# Patient Record
Sex: MALE | Race: BLACK OR AFRICAN AMERICAN | NOT HISPANIC OR LATINO | Employment: STUDENT | URBAN - METROPOLITAN AREA
[De-identification: names, ages, dates, MRNs, and addresses within clinical notes are randomized per-mention and may not be internally consistent; named-entity substitution may affect disease eponyms.]

---
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
## 2024-04-25 ENCOUNTER — OFFICE VISIT (OUTPATIENT)
Dept: URGENT CARE | Facility: CLINIC | Age: 19
End: 2024-04-25
Payer: MEDICAID

## 2024-04-25 VITALS
TEMPERATURE: 98 F | DIASTOLIC BLOOD PRESSURE: 68 MMHG | RESPIRATION RATE: 16 BRPM | HEIGHT: 66 IN | OXYGEN SATURATION: 98 % | BODY MASS INDEX: 21.44 KG/M2 | HEART RATE: 75 BPM | WEIGHT: 133.38 LBS | SYSTOLIC BLOOD PRESSURE: 110 MMHG

## 2024-04-25 DIAGNOSIS — Z20.2 EXPOSURE TO STD: ICD-10-CM

## 2024-04-25 DIAGNOSIS — N34.2 URETHRITIS: Primary | ICD-10-CM

## 2024-04-25 LAB
BILIRUB UR QL STRIP: NEGATIVE
GLUCOSE UR QL STRIP: NEGATIVE
KETONES UR QL STRIP: NEGATIVE
LEUKOCYTE ESTERASE UR QL STRIP: NEGATIVE
PH, POC UA: 5.5 (ref 5–8)
POC BLOOD, URINE: NEGATIVE
POC NITRATES, URINE: NEGATIVE
PROT UR QL STRIP: NEGATIVE
SP GR UR STRIP: 1.02 (ref 1–1.03)
UROBILINOGEN UR STRIP-ACNC: 1 (ref 0.3–2.2)

## 2024-04-25 PROCEDURE — 86593 SYPHILIS TEST NON-TREP QUANT: CPT | Performed by: FAMILY MEDICINE

## 2024-04-25 PROCEDURE — 99214 OFFICE O/P EST MOD 30 MIN: CPT | Mod: S$GLB,,, | Performed by: FAMILY MEDICINE

## 2024-04-25 PROCEDURE — 87389 HIV-1 AG W/HIV-1&-2 AB AG IA: CPT | Performed by: FAMILY MEDICINE

## 2024-04-25 PROCEDURE — 81003 URINALYSIS AUTO W/O SCOPE: CPT | Mod: QW,S$GLB,, | Performed by: FAMILY MEDICINE

## 2024-04-25 PROCEDURE — 87591 N.GONORRHOEAE DNA AMP PROB: CPT | Performed by: FAMILY MEDICINE

## 2024-04-25 PROCEDURE — 87661 TRICHOMONAS VAGINALIS AMPLIF: CPT | Performed by: FAMILY MEDICINE

## 2024-04-25 PROCEDURE — 87491 CHLMYD TRACH DNA AMP PROBE: CPT | Performed by: FAMILY MEDICINE

## 2024-04-25 PROCEDURE — 36415 COLL VENOUS BLD VENIPUNCTURE: CPT | Performed by: FAMILY MEDICINE

## 2024-04-25 RX ORDER — CEFTRIAXONE 500 MG/1
500 INJECTION, POWDER, FOR SOLUTION INTRAMUSCULAR; INTRAVENOUS
Status: COMPLETED | OUTPATIENT
Start: 2024-04-25 | End: 2024-04-25

## 2024-04-25 RX ORDER — DOXYCYCLINE HYCLATE 100 MG
100 TABLET ORAL 2 TIMES DAILY
Qty: 14 TABLET | Refills: 0 | Status: SHIPPED | OUTPATIENT
Start: 2024-04-25 | End: 2024-05-02

## 2024-04-25 RX ADMIN — CEFTRIAXONE 500 MG: 500 INJECTION, POWDER, FOR SOLUTION INTRAMUSCULAR; INTRAVENOUS at 08:04

## 2024-04-26 LAB — HIV 1+2 AB+HIV1 P24 AG SERPL QL IA: NORMAL

## 2024-04-26 NOTE — PATIENT INSTRUCTIONS
Since you are having discharge from the penis we do recommend treating with an antibiotic now for possible gonorrhea or chlamydia.  This would be a shot of Rocephin here in clinic and then starting doxycycline antibiotic today as well this is a 7 day course of an oral antibiotic.    We are going to test for gonorrhea and chlamydia and Trichomonas and syphilis and HIV.      We will call you if there is any abnormal labs.

## 2024-04-26 NOTE — PROGRESS NOTES
"Subjective:      Patient ID: Dwaine Zimmer is a 19 y.o. male.    Vitals:  height is 5' 6.2" (1.681 m) and weight is 60.5 kg (133 lb 6.1 oz). His oral temperature is 98.4 °F (36.9 °C). His blood pressure is 110/68 and his pulse is 75. His respiration is 16 and oxygen saturation is 98%.     Chief Complaint: Exposure to STD    Pt presents today w/ bump on tip of penis ; onset approx 5x days ago. Pt c/o dysuria, white discharge and penile pain. Pt denies fever, swelling and trauma. Pt reports he is sexually active and inconsistent with condom use. Pt didn't try anything for sx.      Exposure to STD  The patient's primary symptoms include genital lesions (had some bump son tip of penis now resolved), penile discharge and penile pain. The patient's pertinent negatives include no genital injury, genital itching, pelvic pain, priapism, scrotal swelling or testicular pain. This is a new problem. The current episode started in the past 7 days. The problem occurs constantly. The problem has been unchanged. Associated symptoms include dysuria and painful intercourse. Pertinent negatives include no abdominal pain, anorexia, chest pain, chills, constipation, coughing, diarrhea, discolored urine, fever, flank pain, frequency, headaches, hematuria, hesitancy, joint pain, joint swelling, nausea, rash, shortness of breath, sore throat, urgency, urinary retention or vomiting. The penile discharge was White. The genital lesion's characteristics include Vesicle and rash. The symptoms are aggravated by urination. He has tried nothing for the symptoms. The treatment provided no relief. He is sexually active. He inconsistently uses condoms. It is unknown whether or not his partner has an STD. There is no history of BPH, chlamydia, cryptorchidism, erectile aid use, erectile dysfunction, a femoral hernia, gonorrhea, herpes simplex, HIV, an inguinal hernia, kidney stones, prostatitis, sickle cell disease, syphilis or varicocele. "       Constitution: Negative for chills and fever.   HENT:  Negative for sore throat.    Cardiovascular:  Negative for chest pain.   Respiratory:  Negative for cough and shortness of breath.    Gastrointestinal:  Negative for abdominal pain, nausea, vomiting, constipation and diarrhea.   Genitourinary:  Positive for dysuria, penile discharge and penile pain. Negative for frequency, urgency, flank pain, scrotal swelling, testicular pain and pelvic pain.   Skin:  Negative for rash.   Neurological:  Negative for headaches.      Objective:     Physical Exam  Constitutional: Pt oriented to person, place, and time.  Non-toxic appearance.   Patient does not appear ill. No distress. normal  HENT: No icterus or facial swelling appreciated  Head: Normocephalic and atraumatic.   Nose: No congestion.   Pulmonary/Chest: Effort normal. No stridor. No respiratory distress.   Abdominal: Normal appearance. Abdomen exhibits no distension.   GYN:  No penile rashes appreciated.  Scrotum without swelling or erythema were appreciable rashes.  No active drainage from meatus  Musculoskeletal:         General: No swelling.   Neurological: no focal deficit. Patient is alert and oriented to person, place, and time.   Skin: Skin is not diaphoretic and not pale. no jaundice  Psychiatric: Patients behavior is normal. Mood, judgment and thought content normal.     Assessment:     1. Urethritis    2. Exposure to STD        Plan:       Urethritis  -     C. trachomatis/N. gonorrhoeae by AMP DNA [facility]; Urine  -     Misc Sendout Test, Non-Blood trichomonas urine test  -     cefTRIAXone injection 500 mg  -     doxycycline (VIBRA-TABS) 100 MG tablet; Take 1 tablet (100 mg total) by mouth 2 (two) times daily. for 7 days  Dispense: 14 tablet; Refill: 0      Possible Exposure to STD  -     POCT Urinalysis, Dipstick, Automated, W/O Scope  -     Misc Sendout Test, Blood treponena pallidum antibody test  -     HIV 1/2 Ag/Ab (4th Gen)      Patient  Instructions   Since you are having discharge from the penis we do recommend treating with an antibiotic now for possible gonorrhea or chlamydia.  This would be a shot of Rocephin here in clinic and then starting doxycycline antibiotic today as well this is a 7 day course of an oral antibiotic.    We are going to test for gonorrhea and chlamydia and Trichomonas and syphilis and HIV.      We will call you if there is any abnormal labs.

## 2024-04-28 LAB
C TRACH DNA SPEC QL NAA+PROBE: NOT DETECTED
N GONORRHOEA DNA SPEC QL NAA+PROBE: NOT DETECTED

## 2024-04-29 LAB — TREPONEMA PALLIDUM IGG+IGM AB [PRESENCE] IN SERUM OR PLASMA BY IMMUNOASSAY: NONREACTIVE

## 2024-05-01 LAB
SPECIMEN SOURCE: NORMAL
T VAGINALIS RRNA SPEC QL NAA+PROBE: NEGATIVE